# Patient Record
Sex: FEMALE | Race: OTHER | URBAN - METROPOLITAN AREA
[De-identification: names, ages, dates, MRNs, and addresses within clinical notes are randomized per-mention and may not be internally consistent; named-entity substitution may affect disease eponyms.]

---

## 2023-08-03 LAB
ABO, EXTERNAL RESULT: NORMAL
HEP B, EXTERNAL RESULT: NEGATIVE
HIV, EXTERNAL RESULT: NON REACTIVE
N. GONORRHOEAE, EXTERNAL RESULT: NEGATIVE
RH FACTOR, EXTERNAL RESULT: POSITIVE
RPR, EXTERNAL RESULT: NON REACTIVE
RUBELLA TITER, EXTERNAL RESULT: NORMAL

## 2023-08-28 ENCOUNTER — ROUTINE PRENATAL (OUTPATIENT)
Age: 23
End: 2023-08-28
Payer: COMMERCIAL

## 2023-08-28 VITALS
HEART RATE: 78 BPM | BODY MASS INDEX: 21.05 KG/M2 | WEIGHT: 131 LBS | DIASTOLIC BLOOD PRESSURE: 70 MMHG | SYSTOLIC BLOOD PRESSURE: 138 MMHG | HEIGHT: 66 IN

## 2023-08-28 DIAGNOSIS — Z3A.20 20 WEEKS GESTATION OF PREGNANCY: ICD-10-CM

## 2023-08-28 DIAGNOSIS — Z36.89 ENCOUNTER FOR FETAL ANATOMIC SURVEY: Primary | ICD-10-CM

## 2023-08-28 PROCEDURE — 76805 OB US >/= 14 WKS SNGL FETUS: CPT | Performed by: OBSTETRICS & GYNECOLOGY

## 2023-08-28 NOTE — PROCEDURES
PATIENT: Saniya Marroquin   -  : 2000   -  DOS:2023   -  INTERPRETING PROVIDER:Eli Abdalla,   Indication  ========    Fetal anatomic survey    Method  ======    Transabdominal ultrasound examination. View: Good view    Dating  ======    LMP on: 4/10/2023  GA by LMP 20 w + 0 d  NAVJOT by LMP: 1/15/2024  Ultrasound examination on: 2023  GA by U/S based upon: [facility], BPD, Femur, HC  GA by U/S 19 w + 2 d  NAVJOT by U/S: 2024  Assigned: based on the LMP, selected on 2023  Assigned GA 20 w + 0 d  Assigned NAVJOT: 1/15/2024    Fetal Biometry  ============    Standard  BPD 42.3 mm 18w 6d 9% Hadlock  OFD 56.5 mm 19w 6d 45% Alex  .7 mm 18w 5d 3% Hadlock  Cerebellum tr 20.2 mm 19w 3d 55% Hill  Nuchal fold 4.8 mm  .2 mm 19w 6d 39% Hadlock  Femur 30.3 mm 19w 3d 21% Hadlock  Humerus 28.0 mm 19w 0d 21% Alex   g 19w 3d 21% Hadlock  EFW (lb) 0 lb  EFW (oz) 10 oz  EFW by: Hadlock (BPD-HC-AC-FL)  Extended   4.7 mm  CM 2.2 mm  <1% Nicolaides  Nasal bone 6.5 mm  Head / Face / Neck  Nasal bone: present  Other Structures   bpm    General Evaluation  ==============    Cardiac activity present.  bpm. Fetal movements: visualized. Presentation: cephalic  Placenta: Placental site: posterior, not low lying  Umbilical cord: Cord vessels: 3 vessel cord. Insertion site: normal insertion  Amniotic fluid: Amount of AF: normal amount.  MVP 3.5 cm    Fetal Anatomy  ===========    Cranium: normal  Lateral ventricles: normal  Choroid plexus: normal  Midline falx: normal  Cavum septi pellucidi: normal  Cerebellum: normal  Cisterna magna: normal  Head / Neck  Neck: normal  Lips: normal  Profile: normal  Nose: normal  Face  Coronal face: normal  Nasal bone: present  Palate: normal  Maxilla: normal  Mandible: normal  Orbits: normal  4-chamber view: normal  RVOT view: normal  LVOT view: normal  3-vessel view: normal  3-vessel-trachea view: normal  Heart / Thorax  Situs: situs

## 2023-10-22 LAB — C. TRACHOMATIS, EXTERNAL RESULT: NEGATIVE

## 2023-12-28 ENCOUNTER — ROUTINE PRENATAL (OUTPATIENT)
Age: 23
End: 2023-12-28

## 2023-12-28 VITALS
TEMPERATURE: 97.5 F | SYSTOLIC BLOOD PRESSURE: 113 MMHG | HEIGHT: 66 IN | RESPIRATION RATE: 18 BRPM | HEART RATE: 99 BPM | WEIGHT: 149 LBS | DIASTOLIC BLOOD PRESSURE: 71 MMHG | OXYGEN SATURATION: 99 % | BODY MASS INDEX: 23.95 KG/M2

## 2023-12-28 DIAGNOSIS — Z86.19 HISTORY OF CHLAMYDIA: ICD-10-CM

## 2023-12-28 DIAGNOSIS — Z98.891 HISTORY OF CESAREAN DELIVERY: ICD-10-CM

## 2023-12-28 DIAGNOSIS — Z34.80 SUPERVISION OF OTHER NORMAL PREGNANCY, ANTEPARTUM: Primary | ICD-10-CM

## 2023-12-28 DIAGNOSIS — O36.8390 FETAL TACHYCARDIA AFFECTING MANAGEMENT OF MOTHER: ICD-10-CM

## 2023-12-28 LAB — GBS, EXTERNAL RESULT: POSITIVE

## 2023-12-28 NOTE — PROGRESS NOTES
Chief Complaint   Patient presents with    Routine Prenatal Visit     Patient is 37 weeks and 3 days. She is not having vaginal bleeding or discharge. She is taking her prenatal vitamins. She is having fetal movement. She has had blanca lo. She would like to discuss her birth plan and wants to know if she is going to have a c- section. Interpretor ID [id number]. No other concerns.       16yo  at 37w3d by LMP    IUP: Rh pos  low risk NIPT, carrier neg x4  GTT, CBC okay  s/p Tdap, declined flu  GBS today   Transfer CrossOver: records to be scanned into media   History of C/S: 2018, labored, failure to progress at 9cm (5hrs), had C/S for NRFHT, daughter needed resuscitation, in [place], no records available, weighed 2916g  counseled on risks associated with TOLAC and concern for history of FTP,  calculator 68% success rate, continue to  especially after SVE  History of Chlamydia: treated, BILL neg, repeat 3rd T screen neg   Fetal Tachycardia: NST reactive and reassuring, many accels to 180s    NST Procedure Note  Indication: fetal tachycardia  Impression: Reactive - 145/mod/+a/-d, no contractions  Time Monitored: 29 minutes    Feeding: breastfeeding  Family Planning: IUD   Estimated Date of Delivery: 1/15/24

## 2023-12-28 NOTE — CONSULTS
Session ID: [id number]  Language: Perez ([address] [place] [address])   ID: #[id number]   Name: Van Orr

## 2023-12-28 NOTE — CONSULTS
Session ID: [id number]  Language: Perez ([address] [place] [address])   ID: #[id number]   Name: Arpit Pacheco

## 2023-12-30 PROBLEM — Z98.891 HISTORY OF CESAREAN DELIVERY: Status: ACTIVE | Noted: 2023-12-30

## 2023-12-30 PROBLEM — Z34.80 SUPERVISION OF OTHER NORMAL PREGNANCY, ANTEPARTUM: Status: ACTIVE | Noted: 2023-12-30

## 2023-12-30 PROBLEM — O36.8390 FETAL TACHYCARDIA AFFECTING MANAGEMENT OF MOTHER: Status: ACTIVE | Noted: 2023-12-30

## 2023-12-30 PROBLEM — Z86.19 HISTORY OF CHLAMYDIA: Status: ACTIVE | Noted: 2023-12-30

## 2023-12-30 LAB
BACTERIA SPEC CULT: ABNORMAL
SERVICE CMNT-IMP: ABNORMAL

## 2024-01-04 ENCOUNTER — ROUTINE PRENATAL (OUTPATIENT)
Age: 24
End: 2024-01-04

## 2024-01-04 VITALS
DIASTOLIC BLOOD PRESSURE: 74 MMHG | HEIGHT: 66 IN | OXYGEN SATURATION: 98 % | BODY MASS INDEX: 23.08 KG/M2 | SYSTOLIC BLOOD PRESSURE: 119 MMHG | HEART RATE: 95 BPM | WEIGHT: 143.6 LBS | RESPIRATION RATE: 18 BRPM | TEMPERATURE: 98.1 F

## 2024-01-04 DIAGNOSIS — O09.93 SUPERVISION OF HIGH RISK PREGNANCY IN THIRD TRIMESTER: Primary | ICD-10-CM

## 2024-01-04 DIAGNOSIS — Z98.891 HISTORY OF CESAREAN DELIVERY: ICD-10-CM

## 2024-01-04 PROCEDURE — 0502F SUBSEQUENT PRENATAL CARE: CPT | Performed by: STUDENT IN AN ORGANIZED HEALTH CARE EDUCATION/TRAINING PROGRAM

## 2024-01-04 ASSESSMENT — PATIENT HEALTH QUESTIONNAIRE - PHQ9
SUM OF ALL RESPONSES TO PHQ9 QUESTIONS 1 & 2: 0
SUM OF ALL RESPONSES TO PHQ QUESTIONS 1-9: 0
SUM OF ALL RESPONSES TO PHQ QUESTIONS 1-9: 0
2. FEELING DOWN, DEPRESSED OR HOPELESS: 0
SUM OF ALL RESPONSES TO PHQ QUESTIONS 1-9: 0
1. LITTLE INTEREST OR PLEASURE IN DOING THINGS: 0
SUM OF ALL RESPONSES TO PHQ QUESTIONS 1-9: 0

## 2024-01-04 NOTE — PROGRESS NOTES
: [id number] & [id number]    Chief Complaint   Patient presents with    Routine Prenatal Visit        Patient identified by name and . Patient is a  at 38w3d.    Taking prenatal vitamins: Yes  Leakage of fluid: No  Vaginal bleeding: No  Feeling baby move if over 20 weeks: Yes  Contractions: Very few, last felt it on 1/3/24  Pain: No    Vitals:    24 1344 24 1401 24 1425   BP: (!) 145/77 (!) 142/72 119/74   Site: Left Upper Arm Right Upper Arm Left Upper Arm   Position: Sitting Sitting Sitting   Cuff Size: Small Adult Small Adult Small Adult   Pulse: 95     Resp: 18     Temp: 98.1 °F (36.7 °C)     TempSrc: Oral     SpO2: 98%     Weight: 65.1 kg (143 lb 9.6 oz)     Height: 1.664 m (5' 5.5\")            There is no immunization history on file for this patient.    1. Have you been to the ER, urgent care clinic since your last visit?  Hospitalized since your last visit? No    2. Have you seen or consulted any other health care providers outside of the [facility] since your last visit?  Include any pap smears or colon screening. No

## 2024-01-04 NOTE — PROGRESS NOTES
[facility]  Routine Prenatal Visit        23 y.o.  at 38w3d by LMP/20ws here for NIKOLE. Doing well, no complaints. -CTX, +FM, -vaginal bleeding or discharge, -PreE sxs including HA, Vision changes CP/SOB, RUQ pain, or COLE.      BP Readings from Last 3 Encounters:   24 (!) 142/72   23 113/71   23 138/70       Wt Readings from Last 10 Encounters:   24 65.1 kg (143 lb 9.6 oz)   23 67.6 kg (149 lb)   23 59.4 kg (131 lb)         General: NAD, well appearing  Resp: no increased WOB  Cardiac: color good, appears well perfused  Abdomen: no abdominal tenderness or distention  Extremities: no edema    SVE: deferred as she desires repeat    FH: 39  FHT: 145       24yo  at 37w3d by LMP/20ws     IUP: Rh pos  low risk NIPT, carrier neg x4  GTT, CBC okay  s/p Tdap, declined flu  GBS positive   Transfer CrossOver: records to be scanned into media   History of C/S: 2018, labored, failure to progress at 9cm (5hrs), had C/S for NRFHT, daughter needed resuscitation, in [place], no records available, weighed 2916g  counseled on risks associated with TOLAC and concern for history of FTP,  calculator 68% success rate. Discussed risks today. Desires repeat. Scheduled for  at 39w1d.  History of Chlamydia: treated, BILL neg, repeat 3rd T screen neg   GBS Positive: will need IAP.      Today: TOLAC counseling, scheduled repeat today for  at 12pm at 39 weeks .    Next Visit:         Follow up in 1 weeks.  Next appointment scheduled 1/10/2024    Derik Rhoades MD, MPH  [facility]

## 2024-01-09 ENCOUNTER — ANESTHESIA EVENT (OUTPATIENT)
Facility: HOSPITAL | Age: 24
DRG: 540 | End: 2024-01-09
Payer: COMMERCIAL

## 2024-01-09 ENCOUNTER — HOSPITAL ENCOUNTER (INPATIENT)
Facility: HOSPITAL | Age: 24
LOS: 2 days | Discharge: HOME | DRG: 540 | End: 2024-01-11
Attending: FAMILY MEDICINE | Admitting: FAMILY MEDICINE
Payer: COMMERCIAL

## 2024-01-09 ENCOUNTER — ANESTHESIA (OUTPATIENT)
Facility: HOSPITAL | Age: 24
DRG: 540 | End: 2024-01-09
Payer: COMMERCIAL

## 2024-01-09 DIAGNOSIS — Z98.891 HISTORY OF CESAREAN DELIVERY: Primary | ICD-10-CM

## 2024-01-09 PROBLEM — Z3A.39 39 WEEKS GESTATION OF PREGNANCY: Status: ACTIVE | Noted: 2024-01-09

## 2024-01-09 LAB
ABO + RH BLD: NORMAL
BLOOD GROUP ANTIBODIES SERPL: NORMAL
ERYTHROCYTE [DISTWIDTH] IN BLOOD BY AUTOMATED COUNT: 12.6 % (ref 11.5–14.5)
HCT VFR BLD AUTO: 30.6 % (ref 35–47)
HGB BLD-MCNC: 10.5 G/DL (ref 11.5–16)
MCH RBC QN AUTO: 28.5 PG (ref 26–34)
MCHC RBC AUTO-ENTMCNC: 34.3 G/DL (ref 30–36.5)
MCV RBC AUTO: 82.9 FL (ref 80–99)
NRBC # BLD: 0 K/UL (ref 0–0.01)
NRBC BLD-RTO: 0 PER 100 WBC
PLATELET # BLD AUTO: 273 K/UL (ref 150–400)
PMV BLD AUTO: 10.1 FL (ref 8.9–12.9)
RBC # BLD AUTO: 3.69 M/UL (ref 3.8–5.2)
SPECIMEN EXP DATE BLD: NORMAL
WBC # BLD AUTO: 6.5 K/UL (ref 3.6–11)

## 2024-01-09 PROCEDURE — 2580000003 HC RX 258: Performed by: NURSE ANESTHETIST, CERTIFIED REGISTERED

## 2024-01-09 PROCEDURE — 36415 COLL VENOUS BLD VENIPUNCTURE: CPT

## 2024-01-09 PROCEDURE — 1120000000 HC RM PRIVATE OB

## 2024-01-09 PROCEDURE — 3700000000 HC ANESTHESIA ATTENDED CARE: Performed by: FAMILY MEDICINE

## 2024-01-09 PROCEDURE — 94761 N-INVAS EAR/PLS OXIMETRY MLT: CPT

## 2024-01-09 PROCEDURE — 85027 COMPLETE CBC AUTOMATED: CPT

## 2024-01-09 PROCEDURE — 2580000003 HC RX 258

## 2024-01-09 PROCEDURE — 7100000000 HC PACU RECOVERY - FIRST 15 MIN: Performed by: FAMILY MEDICINE

## 2024-01-09 PROCEDURE — 86901 BLOOD TYPING SEROLOGIC RH(D): CPT

## 2024-01-09 PROCEDURE — 6360000002 HC RX W HCPCS

## 2024-01-09 PROCEDURE — 2500000003 HC RX 250 WO HCPCS: Performed by: NURSE ANESTHETIST, CERTIFIED REGISTERED

## 2024-01-09 PROCEDURE — 86780 TREPONEMA PALLIDUM: CPT

## 2024-01-09 PROCEDURE — 86850 RBC ANTIBODY SCREEN: CPT

## 2024-01-09 PROCEDURE — 6360000002 HC RX W HCPCS: Performed by: NURSE ANESTHETIST, CERTIFIED REGISTERED

## 2024-01-09 PROCEDURE — 3609079900 HC CESAREAN SECTION: Performed by: FAMILY MEDICINE

## 2024-01-09 PROCEDURE — 3700000001 HC ADD 15 MINUTES (ANESTHESIA): Performed by: FAMILY MEDICINE

## 2024-01-09 PROCEDURE — 7100000001 HC PACU RECOVERY - ADDTL 15 MIN: Performed by: FAMILY MEDICINE

## 2024-01-09 PROCEDURE — 86900 BLOOD TYPING SEROLOGIC ABO: CPT

## 2024-01-09 RX ORDER — SODIUM CHLORIDE, SODIUM LACTATE, POTASSIUM CHLORIDE, AND CALCIUM CHLORIDE .6; .31; .03; .02 G/100ML; G/100ML; G/100ML; G/100ML
1000 INJECTION, SOLUTION INTRAVENOUS ONCE
Status: COMPLETED | OUTPATIENT
Start: 2024-01-09 | End: 2024-01-09

## 2024-01-09 RX ORDER — SODIUM CHLORIDE 0.9 % (FLUSH) 0.9 %
5-40 SYRINGE (ML) INJECTION EVERY 12 HOURS SCHEDULED
Status: DISCONTINUED | OUTPATIENT
Start: 2024-01-09 | End: 2024-01-11 | Stop reason: HOSPADM

## 2024-01-09 RX ORDER — SODIUM CHLORIDE 9 MG/ML
INJECTION, SOLUTION INTRAVENOUS PRN
Status: DISCONTINUED | OUTPATIENT
Start: 2024-01-09 | End: 2024-01-11 | Stop reason: HOSPADM

## 2024-01-09 RX ORDER — OXYTOCIN 10 [USP'U]/ML
INJECTION, SOLUTION INTRAMUSCULAR; INTRAVENOUS PRN
Status: DISCONTINUED | OUTPATIENT
Start: 2024-01-09 | End: 2024-01-09 | Stop reason: SDUPTHER

## 2024-01-09 RX ORDER — DOCUSATE SODIUM 100 MG/1
100 CAPSULE, LIQUID FILLED ORAL 2 TIMES DAILY
Status: DISCONTINUED | OUTPATIENT
Start: 2024-01-09 | End: 2024-01-11 | Stop reason: HOSPADM

## 2024-01-09 RX ORDER — 0.9 % SODIUM CHLORIDE 0.9 %
INTRAVENOUS SOLUTION INTRAVENOUS PRN
Status: DISCONTINUED | OUTPATIENT
Start: 2024-01-09 | End: 2024-01-09 | Stop reason: SDUPTHER

## 2024-01-09 RX ORDER — ACETAMINOPHEN 500 MG
1000 TABLET ORAL EVERY 8 HOURS PRN
Status: DISCONTINUED | OUTPATIENT
Start: 2024-01-09 | End: 2024-01-11 | Stop reason: HOSPADM

## 2024-01-09 RX ORDER — FAMOTIDINE 10 MG/ML
INJECTION, SOLUTION INTRAVENOUS PRN
Status: DISCONTINUED | OUTPATIENT
Start: 2024-01-09 | End: 2024-01-09 | Stop reason: SDUPTHER

## 2024-01-09 RX ORDER — SODIUM CHLORIDE 0.9 % (FLUSH) 0.9 %
10 SYRINGE (ML) INJECTION PRN
Status: DISCONTINUED | OUTPATIENT
Start: 2024-01-09 | End: 2024-01-11 | Stop reason: HOSPADM

## 2024-01-09 RX ORDER — ONDANSETRON 2 MG/ML
4 INJECTION INTRAMUSCULAR; INTRAVENOUS EVERY 6 HOURS PRN
Status: DISCONTINUED | OUTPATIENT
Start: 2024-01-09 | End: 2024-01-11 | Stop reason: HOSPADM

## 2024-01-09 RX ORDER — SODIUM CHLORIDE, SODIUM LACTATE, POTASSIUM CHLORIDE, CALCIUM CHLORIDE 600; 310; 30; 20 MG/100ML; MG/100ML; MG/100ML; MG/100ML
INJECTION, SOLUTION INTRAVENOUS CONTINUOUS
Status: DISCONTINUED | OUTPATIENT
Start: 2024-01-09 | End: 2024-01-11 | Stop reason: HOSPADM

## 2024-01-09 RX ORDER — KETOROLAC TROMETHAMINE 30 MG/ML
INJECTION, SOLUTION INTRAMUSCULAR; INTRAVENOUS PRN
Status: DISCONTINUED | OUTPATIENT
Start: 2024-01-09 | End: 2024-01-09 | Stop reason: SDUPTHER

## 2024-01-09 RX ORDER — LANOLIN/MINERAL OIL
LOTION (ML) TOPICAL
Status: DISCONTINUED | OUTPATIENT
Start: 2024-01-09 | End: 2024-01-11 | Stop reason: HOSPADM

## 2024-01-09 RX ORDER — KETOROLAC TROMETHAMINE 30 MG/ML
20 INJECTION, SOLUTION INTRAMUSCULAR; INTRAVENOUS EVERY 6 HOURS
Status: DISCONTINUED | OUTPATIENT
Start: 2024-01-09 | End: 2024-01-10

## 2024-01-09 RX ORDER — SWAB
1 SWAB, NON-MEDICATED MISCELLANEOUS DAILY
Status: DISCONTINUED | OUTPATIENT
Start: 2024-01-09 | End: 2024-01-11 | Stop reason: HOSPADM

## 2024-01-09 RX ORDER — IBUPROFEN 800 MG/1
800 TABLET ORAL EVERY 6 HOURS
Status: DISCONTINUED | OUTPATIENT
Start: 2024-01-10 | End: 2024-01-10

## 2024-01-09 RX ORDER — ACETAMINOPHEN 325 MG/1
975 TABLET ORAL ONCE
Status: DISCONTINUED | OUTPATIENT
Start: 2024-01-09 | End: 2024-01-09

## 2024-01-09 RX ORDER — ONDANSETRON 2 MG/ML
INJECTION INTRAMUSCULAR; INTRAVENOUS PRN
Status: DISCONTINUED | OUTPATIENT
Start: 2024-01-09 | End: 2024-01-09 | Stop reason: SDUPTHER

## 2024-01-09 RX ORDER — ACETAMINOPHEN 500 MG
1000 TABLET ORAL EVERY 8 HOURS SCHEDULED
Status: CANCELLED | OUTPATIENT
Start: 2024-01-09

## 2024-01-09 RX ORDER — FENTANYL CITRATE 50 UG/ML
INJECTION, SOLUTION INTRAMUSCULAR; INTRAVENOUS PRN
Status: DISCONTINUED | OUTPATIENT
Start: 2024-01-09 | End: 2024-01-09 | Stop reason: SDUPTHER

## 2024-01-09 RX ORDER — OXYCODONE HYDROCHLORIDE 5 MG/1
5 TABLET ORAL EVERY 4 HOURS PRN
Status: DISCONTINUED | OUTPATIENT
Start: 2024-01-09 | End: 2024-01-11 | Stop reason: HOSPADM

## 2024-01-09 RX ORDER — IBUPROFEN 800 MG/1
800 TABLET ORAL EVERY 8 HOURS SCHEDULED
Status: DISCONTINUED | OUTPATIENT
Start: 2024-01-09 | End: 2024-01-09 | Stop reason: SDUPTHER

## 2024-01-09 RX ORDER — BUPIVACAINE HYDROCHLORIDE 7.5 MG/ML
INJECTION, SOLUTION INTRASPINAL PRN
Status: DISCONTINUED | OUTPATIENT
Start: 2024-01-09 | End: 2024-01-09 | Stop reason: SDUPTHER

## 2024-01-09 RX ORDER — SODIUM CHLORIDE 0.9 % (FLUSH) 0.9 %
5-40 SYRINGE (ML) INJECTION PRN
Status: DISCONTINUED | OUTPATIENT
Start: 2024-01-09 | End: 2024-01-11 | Stop reason: HOSPADM

## 2024-01-09 RX ORDER — MORPHINE SULFATE 1 MG/ML
INJECTION, SOLUTION EPIDURAL; INTRATHECAL; INTRAVENOUS PRN
Status: DISCONTINUED | OUTPATIENT
Start: 2024-01-09 | End: 2024-01-09 | Stop reason: SDUPTHER

## 2024-01-09 RX ADMIN — SODIUM CHLORIDE, POTASSIUM CHLORIDE, SODIUM LACTATE AND CALCIUM CHLORIDE: 600; 310; 30; 20 INJECTION, SOLUTION INTRAVENOUS at 14:13

## 2024-01-09 RX ADMIN — SODIUM CHLORIDE, POTASSIUM CHLORIDE, SODIUM LACTATE AND CALCIUM CHLORIDE: 600; 310; 30; 20 INJECTION, SOLUTION INTRAVENOUS at 20:42

## 2024-01-09 RX ADMIN — OXYTOCIN 30 UNITS: 10 INJECTION INTRAVENOUS at 13:26

## 2024-01-09 RX ADMIN — SODIUM CHLORIDE, POTASSIUM CHLORIDE, SODIUM LACTATE AND CALCIUM CHLORIDE: 600; 310; 30; 20 INJECTION, SOLUTION INTRAVENOUS at 11:44

## 2024-01-09 RX ADMIN — BUPIVACAINE HYDROCHLORIDE IN DEXTROSE 1.3 ML: 7.5 INJECTION, SOLUTION SUBARACHNOID at 12:59

## 2024-01-09 RX ADMIN — FENTANYL CITRATE 10 MCG: 50 INJECTION, SOLUTION INTRAMUSCULAR; INTRAVENOUS at 12:59

## 2024-01-09 RX ADMIN — MORPHINE SULFATE 0.15 MG: 1 INJECTION, SOLUTION EPIDURAL; INTRATHECAL; INTRAVENOUS at 12:59

## 2024-01-09 RX ADMIN — SODIUM CHLORIDE 500 ML: 900 INJECTION, SOLUTION INTRAVENOUS at 14:21

## 2024-01-09 RX ADMIN — PHENYLEPHRINE HYDROCHLORIDE 20 MCG/MIN: 10 INJECTION INTRAVENOUS at 12:59

## 2024-01-09 RX ADMIN — FAMOTIDINE 20 MG: 10 INJECTION, SOLUTION INTRAVENOUS at 12:53

## 2024-01-09 RX ADMIN — KETOROLAC TROMETHAMINE 15 MG: 30 INJECTION, SOLUTION INTRAMUSCULAR; INTRAVENOUS at 13:59

## 2024-01-09 RX ADMIN — CEFAZOLIN SODIUM 2000 MG: 1 POWDER, FOR SOLUTION INTRAMUSCULAR; INTRAVENOUS at 13:06

## 2024-01-09 RX ADMIN — SODIUM CHLORIDE, POTASSIUM CHLORIDE, SODIUM LACTATE AND CALCIUM CHLORIDE 1000 ML: 600; 310; 30; 20 INJECTION, SOLUTION INTRAVENOUS at 10:40

## 2024-01-09 RX ADMIN — ONDANSETRON 4 MG: 2 INJECTION INTRAMUSCULAR; INTRAVENOUS at 12:53

## 2024-01-09 RX ADMIN — KETOROLAC TROMETHAMINE 20 MG: 30 INJECTION, SOLUTION INTRAMUSCULAR; INTRAVENOUS at 23:43

## 2024-01-09 ASSESSMENT — PAIN DESCRIPTION - DESCRIPTORS: DESCRIPTORS: ACHING;CRAMPING;SORE

## 2024-01-09 ASSESSMENT — PAIN SCALES - GENERAL: PAINLEVEL_OUTOF10: 4

## 2024-01-09 ASSESSMENT — PAIN DESCRIPTION - ORIENTATION: ORIENTATION: LOWER

## 2024-01-09 ASSESSMENT — PAIN DESCRIPTION - LOCATION: LOCATION: ABDOMEN;INCISION

## 2024-01-09 NOTE — CARE COORDINATION
1/9/2024  3:55 PM  CM obtained signed breast pump order and uploaded via [employer] to [email or web address].  Request for pump submitted and requested to be mailed to CEDRICK's home.     1:59 PM  CM met with CEDRICK to complete initial assessment and begin discharge planning.  MOB verified and confirmed demographics.  CEDRICK lives with Ernesto Nickerson, along with her 5yr old, at the address on file. CEDRICK reports she has good family support, and feels like she has the support she needs when she returns home.  CEDRICK denied any concerns for safety for herself or for infant. CEDRICK plans to breast and bottle feed baby and would like to order a pump to use at home.  [facility] will provide follow up care for infant. CEDRICK has car seat, bassinet/crib, clothing, bottles and all necessary supplies for baby. CEDRICK has [employer], and will be adding baby to this policy. CM discussed process to add baby to insurance, CEDRICK verbalized understanding.       01/09/24 1358   Service Assessment   Patient Orientation Alert and Oriented   Cognition Alert   History Provided By Patient   Primary Caregiver Family   Support Systems Spouse/Significant Other   PCP Verified by CM Yes   Prior Functional Level Independent in ADLs/IADLs   Current Functional Level Independent in ADLs/IADLs   Can patient return to prior living arrangement Yes   Family able to assist with home care needs: Yes   Would you like for me to discuss the discharge plan with any other family members/significant others, and if so, who? No   Financial Resources Medicaid     ALCON Jacobs

## 2024-01-09 NOTE — ANESTHESIA PRE PROCEDURE
Department of Anesthesiology  Preprocedure Note       Name:  Yolanda Cheney   Age:  23 y.o.  :  2000                                          MRN:  [id number]         Date:  2024      Surgeon: Surgeon(s):  Asiya Salgado DO    Procedure: Procedure(s):   SECTION    Medications prior to admission:   Prior to Admission medications    Medication Sig Start Date End Date Taking? Authorizing Provider   Prenatal Vit-Fe Fumarate-FA (PRENATAL VITAMINS PO) Take by mouth    Provider, MD Karla       Current medications:    Current Facility-Administered Medications   Medication Dose Route Frequency Provider Last Rate Last Admin   • lactated ringers IV soln infusion   IntraVENous Continuous Peggy Herrera DO       • lactated ringers bolus bolus 1,000 mL  1,000 mL IntraVENous Once Peggy Herrera DO       • sodium chloride flush 0.9 % injection 5-40 mL  5-40 mL IntraVENous 2 times per day Peggy Herrera DO       • sodium chloride flush 0.9 % injection 10 mL  10 mL IntraVENous PRN Peggy Herrera DO       • 0.9 % sodium chloride infusion   IntraVENous PRN Peggy Herrera DO       • famotidine (PEPCID) 20 mg in sodium chloride (PF) 0.9 % 10 mL injection  20 mg IntraVENous Once Peggy Herrera DO       • acetaminophen (TYLENOL) tablet 975 mg  975 mg Oral Once Peggy Herrera DO       • oxytocin (PITOCIN) 30 units in 500 mL infusion  87.3 jerald-units/min IntraVENous Continuous PRN Peggy Herrera DO        And   • oxytocin (PITOCIN) 10 unit bolus from the bag  10 Units IntraVENous PRN Peggy Herrera DO       • ondansetron (ZOFRAN) injection 4 mg  4 mg IntraVENous Q6H PRN Peggy Herrera DO       • ceFAZolin (ANCEF) 2,000 mg in sterile water 20 mL IV syringe  2,000 mg IntraVENous Once Peggy Herrera DO           Allergies:  No Known Allergies    Problem List:    Patient Active Problem List

## 2024-01-09 NOTE — L&D DELIVERY NOTE
Daniel Araujo, Female Yolanda [[id number]]    24yo  at 39w1d s/p rLTCS. Pregnancy notable for transfer of care from [facility], history of C/S for NRFHT/FTP at 9cm, history of chlamydia (neg BILL), GBS pos.   Labor Events     Labor: No   Steroids: None  Cervical Ripening Date/Time:        Rupture Date/Time:   13:23:30   Rupture Type: AROM  Fluid Color: Clear  Fluid Odor: None  Fluid Volume: Moderate  Induction: None  Augmentation: None  Labor Complications: None              Anesthesia    Method: Spinal       Delivery Details      Delivery Date: 24 Delivery Time: 13:24:00   Delivery Type: , Classical  Trial of Labor?: No   Categorization: Repeat   Priority: scheduled  Indications for : Prior Uterine Surgery       Skin Incision Type: Low Transverse  Uterine Incision: Low Transverse        Presentation    Presentation: Vertex       Shoulder Dystocia    Shoulder Dystocia Present?: No       Assisted Delivery Details    Forceps Attempted?: No  Vacuum Extractor Attempted?: No                           Cord    Vessels: 3 Vessels  Complications: None  Delayed Cord Clamping?: Yes  Cord Clamped Date/Time: 2024 13:25:00  Cord Blood Disposition: Discard  Gases Sent?: No              Placenta    Date/Time: 2024 13:25:00  Removal: Manual Removal       Lacerations    Episiotomy: None  Other Lacerations: no non-perineal laceration  Number of Repair Packets: 0       Blood Loss  Mother: Daniel Araujo Yolanda Salas #[id number]     Start of Mother's Information      Delivery Blood Loss  24 1244 - 24 1417      None                 End of Mother's Information  Mother: Daniel Araujo Yolanda Salas #[id number]                Delivery Providers    Delivering clinician: Asiya Salgado, DO     Provider Role    Asiya Salgado, DO Obstetrician    Noelle Lindquist, RN Primary Nurse    Netta Fam, RN Primary  Nurse

## 2024-01-09 NOTE — OP NOTE
Section Operative Note    Date: 2024    Procedure:   1. Repeat  section via low transverse uterine incision    Pre-operative Diagnosis:   1. 39w1d intrauterine pregnancy  2. History of C/S x 1; desires repeat.    Post-Operative Diagnosis: same; delivered     Attending Surgeon: Asiya Salgado DO  Assistant Surgeon: Brendan Carpenter Jr., MD  Surgical Assistant: Mariah Iniguez    Assistants:   Dr. Carpenter, the assistant surgeon, was present during the procedure.  The physician's presence was necessary due to repeat .The assistant surgeon performed significant portions of the surgery, including incising tissue, clamping, control of bleeding with suturing and cauterization, and decision making at challenging portions of the procedure.  This assistance was necessary to accomplish the optimal outcome for the patient, above and beyond what a non-MD assistant could have provided.)    Anesthesia: Spinal     Operative Findings:  1. Female infant in cephalic presentation, Apgar scores of 9   at 1 minute and 9   at 5 minutes and a birthweight pending per protocol.  2. Normal bilateral fallopian tubes and ovaries   3. Normal Uterus and uterine cavity    Estimated Blood Loss:  640cc  Urine Output: 90 mL of clear, dark yellow urine  IV Fluids: 3000 mL crystalloid    Drains: Padro to gravity           Specimens:   1. Placenta discarded - small clot noted at velamentous-appearing insertion           Complications:  None           Disposition: Back to L&D, stable            Indications: Yolanda Cheney is a 23 y.o.  at 39w1d with an NAVJOT of 1/15/2024, by Last Menstrual Period who was admitted for scheduled repeat C/S.  The risks, benefits, complications, and expected outcomes of  section were discussed with the patient. The patient agreed with the proposed plan; and all consents were signed and witnessed. She agreed to accept blood products if

## 2024-01-09 NOTE — ANESTHESIA POSTPROCEDURE EVALUATION
Department of Anesthesiology  Postprocedure Note    Patient: Yolanda Cheney  MRN: [id number]  YOB: 2000  Date of evaluation: 2024    Procedure Summary     Date: 24 Room / Location: [facility] L&D 02 / [facility] L&D OR    Anesthesia Start: 1251 Anesthesia Stop: 1431    Procedure:  SECTION Diagnosis:       Previous  delivery affecting pregnancy      (Previous  delivery affecting pregnancy [O34.219])    Surgeons: Asiya Salgado DO Responsible Provider: Justus Gates MD    Anesthesia Type: Spinal ASA Status: 2          Anesthesia Type: Spinal    Miguel Phase I:      Miguel Phase II:      Anesthesia Post Evaluation    Patient location during evaluation: PACU  Patient participation: complete - patient participated  Level of consciousness: awake  Airway patency: patent  Nausea & Vomiting: no vomiting and no nausea  Cardiovascular status: hemodynamically stable  Respiratory status: acceptable  Hydration status: stable  Pain management: adequate        No notable events documented.

## 2024-01-09 NOTE — ANESTHESIA POSTPROCEDURE EVALUATION
Department of Anesthesiology  Postprocedure Note    Patient: Yolanda Cheney  MRN: [id number]  YOB: 2000  Date of evaluation: 2024    Procedure Summary     Date: 24 Room / Location: [facility] L&D 02 / [facility] L&D OR    Anesthesia Start: 1251 Anesthesia Stop: 1431    Procedure:  SECTION Diagnosis:       Previous  delivery affecting pregnancy      (Previous  delivery affecting pregnancy [O34.219])    Surgeons: Asiya Salgado DO Responsible Provider: Justus Gates MD    Anesthesia Type: Spinal ASA Status: 2          Anesthesia Type: Spinal    Miguel Phase I:      Miguel Phase II:      Anesthesia Post Evaluation    Patient location during evaluation: PACU  Patient participation: complete - patient participated  Level of consciousness: awake  Airway patency: patent  Nausea & Vomiting: no vomiting and no nausea  Cardiovascular status: hemodynamically stable  Respiratory status: acceptable  Hydration status: stable  Pain management: adequate        No notable events documented.

## 2024-01-09 NOTE — LACTATION NOTE
This note was copied from a baby's chart.  Experienced breastfeeding mother.     Discussed with mother her plan for feeding.  Reviewed the benefits of exclusive breast milk feeding during the hospital stay.   Informed her of the risks of using formula to supplement in the first few days of life as well as the benefits of successful breast milk feeding; referred her to the Breastfeeding booklet about this information.   She acknowledges understanding of information reviewed and states that it is her plan to breast/formula feed her infant.  Will support her choice and offer additional information as needed.       Encouraged mom to attempt feeding with baby led feeding cues. Just as sucking on fingers, rooting, mouthing.   Looking for 8-12 feedings in 24 hours.   Don't limit baby at breast, allow baby to come of breast on it's own. Baby may want to feed  often and may increase number of feedings on second day of life. Skin to skin encouraged.      If baby doesn't nurse,  Mom should  hand express  10-20 drops of colostrum and drip into baby's mouth, or give to baby by finger feeding, cup feeding, or spoon feeding at least every 2-3 hours.     Mother will successfully establish breastfeeding by feeding in response to early feeding cues   or wake every 3h, will obtain deep latch, and will keep log of feedings/output.  Taught to BF at hunger cues and or q 2-3 hrs and to offer 10-20 drops of hand expressed colostrum at any non-feeds.      Left Breast: Soft  Left Nipple: Protrude  Right Nipple: Protrude  Right Breast: Soft                Breast Care: Lanolin provided     Lactation Comment: Baby last breast fed at 1425 for 30 minutes.    Breastfeeding handouts given in [place].

## 2024-01-09 NOTE — ANESTHESIA PROCEDURE NOTES
Spinal Block    Patient location during procedure: OR  End time: 1/9/2024 12:59 PM  Reason for block: primary anesthetic  Staffing  Performed: resident/CRNA   Anesthesiologist: Justus Gates MD  Resident/CRNA: Fernando Nj APRN - CRNA  Performed by: Fernando Nj APRN - CRNA  Authorized by: Justus Gates MD    Spinal Block  Patient position: sitting  Prep: DuraPrep  Patient monitoring: continuous pulse ox and frequent blood pressure checks  Approach: midline  Location: L3/L4  Provider prep: mask and sterile gloves  Needle  Needle type: Pencan   Needle gauge: 25 G  Needle length: 3.5 in  Assessment  Sensory level: T4  Swirl obtained: Yes  CSF: clear  Attempts: 1  Hemodynamics: stable  Preanesthetic Checklist  Completed: patient identified, IV checked, site marked, risks and benefits discussed, surgical/procedural consents, equipment checked, pre-op evaluation, timeout performed, anesthesia consent given, oxygen available, monitors applied/VS acknowledged, fire risk safety assessment completed and verbalized and blood product R/B/A discussed and consented

## 2024-01-10 LAB
ERYTHROCYTE [DISTWIDTH] IN BLOOD BY AUTOMATED COUNT: 12.8 % (ref 11.5–14.5)
HCT VFR BLD AUTO: 26.9 % (ref 35–47)
HGB BLD-MCNC: 9.1 G/DL (ref 11.5–16)
MCH RBC QN AUTO: 28.5 PG (ref 26–34)
MCHC RBC AUTO-ENTMCNC: 33.8 G/DL (ref 30–36.5)
MCV RBC AUTO: 84.3 FL (ref 80–99)
NRBC # BLD: 0 K/UL (ref 0–0.01)
NRBC BLD-RTO: 0 PER 100 WBC
PLATELET # BLD AUTO: 218 K/UL (ref 150–400)
PMV BLD AUTO: 9.9 FL (ref 8.9–12.9)
RBC # BLD AUTO: 3.19 M/UL (ref 3.8–5.2)
T PALLIDUM AB SER QL IA: NON REACTIVE
WBC # BLD AUTO: 7.3 K/UL (ref 3.6–11)

## 2024-01-10 PROCEDURE — 6360000002 HC RX W HCPCS

## 2024-01-10 PROCEDURE — 1120000000 HC RM PRIVATE OB

## 2024-01-10 PROCEDURE — 36415 COLL VENOUS BLD VENIPUNCTURE: CPT

## 2024-01-10 PROCEDURE — 94761 N-INVAS EAR/PLS OXIMETRY MLT: CPT

## 2024-01-10 PROCEDURE — 85027 COMPLETE CBC AUTOMATED: CPT

## 2024-01-10 PROCEDURE — 2580000003 HC RX 258

## 2024-01-10 PROCEDURE — 6370000000 HC RX 637 (ALT 250 FOR IP): Performed by: STUDENT IN AN ORGANIZED HEALTH CARE EDUCATION/TRAINING PROGRAM

## 2024-01-10 PROCEDURE — 6370000000 HC RX 637 (ALT 250 FOR IP)

## 2024-01-10 PROCEDURE — 6360000002 HC RX W HCPCS: Performed by: STUDENT IN AN ORGANIZED HEALTH CARE EDUCATION/TRAINING PROGRAM

## 2024-01-10 RX ORDER — KETOROLAC TROMETHAMINE 30 MG/ML
30 INJECTION, SOLUTION INTRAMUSCULAR; INTRAVENOUS EVERY 6 HOURS
Status: COMPLETED | OUTPATIENT
Start: 2024-01-10 | End: 2024-01-10

## 2024-01-10 RX ORDER — IBUPROFEN 800 MG/1
800 TABLET ORAL EVERY 6 HOURS
Status: DISCONTINUED | OUTPATIENT
Start: 2024-01-10 | End: 2024-01-11 | Stop reason: HOSPADM

## 2024-01-10 RX ADMIN — Medication 1 TABLET: at 09:53

## 2024-01-10 RX ADMIN — KETOROLAC TROMETHAMINE 30 MG: 30 INJECTION, SOLUTION INTRAMUSCULAR; INTRAVENOUS at 12:27

## 2024-01-10 RX ADMIN — DOCUSATE SODIUM 100 MG: 100 CAPSULE, LIQUID FILLED ORAL at 09:53

## 2024-01-10 RX ADMIN — IBUPROFEN 800 MG: 800 TABLET, FILM COATED ORAL at 18:21

## 2024-01-10 RX ADMIN — KETOROLAC TROMETHAMINE 20 MG: 30 INJECTION, SOLUTION INTRAMUSCULAR; INTRAVENOUS at 06:32

## 2024-01-10 RX ADMIN — SODIUM CHLORIDE, PRESERVATIVE FREE 10 ML: 5 INJECTION INTRAVENOUS at 12:28

## 2024-01-10 ASSESSMENT — PAIN DESCRIPTION - LOCATION: LOCATION: ABDOMEN;INCISION

## 2024-01-10 ASSESSMENT — PAIN DESCRIPTION - ORIENTATION: ORIENTATION: LOWER

## 2024-01-10 ASSESSMENT — PAIN DESCRIPTION - DESCRIPTORS: DESCRIPTORS: ACHING;CRAMPING;SORE

## 2024-01-10 ASSESSMENT — PAIN SCALES - GENERAL: PAINLEVEL_OUTOF10: 6

## 2024-01-10 NOTE — LACTATION NOTE
This note was copied from a baby's chart.  Mother was sitting up in bed. Baby was having a hearing screen done. Mother has been breast/formula feeding her baby. Encouraged mother to offer baby her breast milk first.     Reviewed breastfeeding basics:  Supply and demand,  stomach size, early  Feeding cues, skin to skin, positioning and baby led latch-on, assymetrical latch with signs of good, deep latch vs shallow, feeding frequency and duration, and log sheet for tracking infant feedings and output.  Breastfeeding Booklet and Warm line information given.  Discussed typical  weight loss and the importance of infant weight checks with pediatrician 1-2 post discharge.    Discussed eating a healthy diet. Instructed mother to eat a variety of foods in order to get a well balanced diet. She should consume an extra 500 calories per day (more than her non-pregnant requirement.) These extra calories will help provide energy needed for optimal breast milk production. Mother also encouraged to \"drink to thirst\" and it is recommended that she drink fluids such as water, fruit/vegetable juice. Nutritious snacks should be available so that she can eat throughout the day to help satisfy her hunger and maintain a good milk supply.    Mother will successfully establish breastfeeding by feeding in response to early feeding cues   or wake every 3h, will obtain deep latch, and will keep log of feedings/output.  Taught to BF at hunger cues and or q 2-3 hrs and to offer 10-20 drops of hand expressed colostrum at any non-feeds.      Left Breast: Soft  Left Nipple: Protrude  Right Nipple: Protrude  Right Breast: Soft               Formula Type: Similac 360 Total Care     \  Breast Care: Nursing pads, Lanolin provided     Lactation Comment: Baby last breast fed at 0925 for 20 minutes and took 33 ml of formula at 0945. Encouraged mother to call  for breastfeeding assistance.

## 2024-01-10 NOTE — PROGRESS NOTES
[address] [place] [address]   Office [phone], Fax [phone]                      Post-Operative Day Number 1 Progress Note    Patient doing well post-operatively without significant complaint.  Pain well controlled.  Lochia normal.  Tolerating normal diet.  Ambulating.  Voiding without difficulty.  Passing flatus.     Vitals:  Patient Vitals for the past 8 hrs:   BP Temp Pulse Resp SpO2   01/10/24 0244 117/71 98.6 °F (37 °C) 89 16 100 %   24 2318 108/70 99 °F (37.2 °C) 99 16 100 %     Temp (24hrs), Av.3 °F (36.8 °C), Min:97.4 °F (36.3 °C), Max:99 °F (37.2 °C)      Exam:  Patient without distress.               CTAB, no w/r/r/c.               RRR, +S1 and S2, no m/r/g.    Abdomen soft, fundus firm at level of umbilicus, nontender.               Perineum with normal lochia noted.               Lower extremities:  No edema. No palpable cords or tenderness.    Lab/Data Review:  Recent Results (from the past 12 hour(s))   CBC    Collection Time: 01/10/24  5:40 AM   Result Value Ref Range    WBC 7.3 3.6 - 11.0 K/uL    RBC 3.19 (L) 3.80 - 5.20 M/uL    Hemoglobin 9.1 (L) 11.5 - 16.0 g/dL    Hematocrit 26.9 (L) 35.0 - 47.0 %    MCV 84.3 80.0 - 99.0 FL    MCH 28.5 26.0 - 34.0 PG    MCHC 33.8 30.0 - 36.5 g/dL    RDW 12.8 11.5 - 14.5 %    Platelets 218 150 - 400 K/uL    MPV 9.9 8.9 - 12.9 FL    Nucleated RBCs 0.0 0  WBC    nRBC 0.00 0.00 - 0.01 K/uL         Assessment and Plan:    Yolanda Cheney is a 23 y.o.  s/p rLTCS at 39+1. Delivery uncomplicated. Pregnancy notable for transfer of care from [facility], hx of C/S for NRFTHT/failure to progress at 9cm, hx of chlamydia with BILL negative. Patient appears to be having uncomplicated post-partum course.      Continue routine perineal care and maternal education.    Plan discharge tomorrow if no problems occur.    Patient discussed with Dr. Doty.                 Peggy Herrera, DO  Family Medicine

## 2024-01-11 VITALS
DIASTOLIC BLOOD PRESSURE: 68 MMHG | OXYGEN SATURATION: 100 % | SYSTOLIC BLOOD PRESSURE: 116 MMHG | RESPIRATION RATE: 15 BRPM | TEMPERATURE: 98.2 F | HEART RATE: 109 BPM

## 2024-01-11 PROCEDURE — 6370000000 HC RX 637 (ALT 250 FOR IP): Performed by: STUDENT IN AN ORGANIZED HEALTH CARE EDUCATION/TRAINING PROGRAM

## 2024-01-11 PROCEDURE — 6370000000 HC RX 637 (ALT 250 FOR IP)

## 2024-01-11 RX ORDER — OXYCODONE HYDROCHLORIDE 5 MG/1
5 TABLET ORAL EVERY 6 HOURS PRN
Qty: 10 TABLET | Refills: 0 | Status: SHIPPED | OUTPATIENT
Start: 2024-01-11 | End: 2024-01-14

## 2024-01-11 RX ORDER — IBUPROFEN 800 MG/1
800 TABLET ORAL EVERY 6 HOURS
Qty: 40 TABLET | Refills: 0 | Status: SHIPPED | OUTPATIENT
Start: 2024-01-11

## 2024-01-11 RX ORDER — POLYETHYLENE GLYCOL 3350 17 G/17G
17 POWDER, FOR SOLUTION ORAL DAILY
Qty: 85 G | Refills: 0 | Status: SHIPPED | OUTPATIENT
Start: 2024-01-11 | End: 2024-01-16

## 2024-01-11 RX ORDER — PSEUDOEPHEDRINE HCL 30 MG
100 TABLET ORAL 2 TIMES DAILY
Qty: 20 CAPSULE | Refills: 0 | Status: SHIPPED | OUTPATIENT
Start: 2024-01-11

## 2024-01-11 RX ADMIN — DOCUSATE SODIUM 100 MG: 100 CAPSULE, LIQUID FILLED ORAL at 13:47

## 2024-01-11 RX ADMIN — IBUPROFEN 800 MG: 800 TABLET, FILM COATED ORAL at 02:22

## 2024-01-11 RX ADMIN — IBUPROFEN 800 MG: 800 TABLET, FILM COATED ORAL at 13:47

## 2024-01-11 RX ADMIN — ACETAMINOPHEN 1000 MG: 500 TABLET ORAL at 13:46

## 2024-01-11 ASSESSMENT — PAIN DESCRIPTION - DESCRIPTORS: DESCRIPTORS: CRAMPING;SORE

## 2024-01-11 ASSESSMENT — PAIN DESCRIPTION - LOCATION: LOCATION: ABDOMEN

## 2024-01-11 ASSESSMENT — PAIN SCALES - GENERAL
PAINLEVEL_OUTOF10: 5
PAINLEVEL_OUTOF10: 0

## 2024-01-11 ASSESSMENT — PAIN DESCRIPTION - ORIENTATION: ORIENTATION: LOWER

## 2024-01-11 NOTE — DISCHARGE SUMMARY
[address] [place] [address]   Office [phone], Fax [phone]    Obstetrical Discharge Summary     Name: Yolanda Cheney MRN: [id number]  SSN: [id number]    YOB: 2000  Age: 23 y.o.  Sex: female      Admit Date: 2024    Discharge Date: 2024     Admitting Physician: Asiya Salgado DO     Attending Physician:  Asiya Salgado DO     Admission Diagnoses: Previous  delivery affecting pregnancy [O34.219]  39 weeks gestation of pregnancy [Z3A.39]    Discharge Diagnoses:   Information for the patient's :  Daniel Araujo, Nolberto Guerrero [[id number]]   @[phone]@      Additional Diagnoses:  No components found for: \"OBEXTABORH\", \"OBEXTABSCRN\", \"OBEXTRUBELLA\", \"OBEXTGRBS\"    Immunization(s): There is no immunization history for the selected administration types on file for this patient.     Hospital Course:   Patient is a 23 y.o.  s/p repeat LTCS at 39 weeks 1 days.  Presented for  repeat LTCS with .  Pregnancy notable for hx of C/S for NRFHT/FTP at 9cm, hx of clamydia (negative BILL).  Delivered TLFI by rLTCS without complications.  Normal hospital course following the delivery.  On day of discharge patient reported minimal lochia, well controlled pain, and no other complaints.  Discharged with pain regimen and bowel regimen.  Advised to continue prenatal vitamins.  Follow up with Dr. Rodriges in 2 weeks for incision check.     Depression Scale  Postpartum Depression: Low Risk  (1/10/2024)    [place]  Depression Scale     Last EPDS Total Score: 2     Last EPDS Self Harm Result: Never        Follow up test at discharge: none  Condition at Discharge:  stable  Disposition: Discharge to Home    Physical exam:  /68   Pulse (!) 109   Temp 98.2 °F (36.8 °C) (Oral)   Resp 15   LMP 04/10/2023   SpO2 100%   Breastfeeding Unknown     Exam:  Patient without distress.               CTAB, no w/r/r/c

## 2024-01-26 ENCOUNTER — OFFICE VISIT (OUTPATIENT)
Age: 24
End: 2024-01-26

## 2024-01-26 VITALS
OXYGEN SATURATION: 98 % | DIASTOLIC BLOOD PRESSURE: 81 MMHG | SYSTOLIC BLOOD PRESSURE: 125 MMHG | WEIGHT: 131 LBS | BODY MASS INDEX: 21.47 KG/M2 | TEMPERATURE: 97.8 F | HEART RATE: 98 BPM

## 2024-01-26 PROCEDURE — 0503F POSTPARTUM CARE VISIT: CPT | Performed by: STUDENT IN AN ORGANIZED HEALTH CARE EDUCATION/TRAINING PROGRAM

## 2024-01-26 NOTE — PROGRESS NOTES
Chief Complaint   Patient presents with    Wound Check     Vitals:    01/26/24 1058 01/26/24 1103   BP: 133/83 125/81   Pulse: 99 98   Temp: 97.8 °F (36.6 °C)    TempSrc: Oral    SpO2: 98%    Weight: 59.4 kg (131 lb)      1. Have you been to the ER, urgent care clinic since your last visit?  Hospitalized since your last visit?No    2. Have you seen or consulted any other health care providers outside of the [facility] since your last visit?  Include any pap smears or colon screening.  no

## 2024-01-26 NOTE — PROGRESS NOTES
[address] [place] [address]   Office [phone], Fax [phone]    Subjective:     History provided by patient       2 week  incision check and Post Partum Note    23 y.o. female , presenting for postpartum visit s/p repeat C/S delivery at 39w1d.  Patient doing well post-partum without significant complaint.      Lochia: normal  Pain: controlled  Baby: doing well, has seen PCP  Sexual activity: has not resumed  Plan for contraception: IUD - to be done at [facility]  Breast/bottle: breast + formula  Support from FOB/family:   Symptoms of depression: none  EDPS score: 2      Objective:     Vitals:    24 1103   BP: 125/81   Pulse: 98   Temp:    SpO2:            Exam:  Patient without distress.    Abdomen soft, fundus firm at level of umbilicus, nontender.    Skin: low transverse  incision is clean dry and intact.  No sign of infection.                Lower extremities:  No edema. No palpable cords or tenderness.      Assessment and orders:     Assessment and Plan:    Yolanda Cheney is a 23 y.o. female s/p rLTCS.  Patient having uncomplicated post-partum course.      Continue routine care  Call clinic or make appointment for symptoms of sadness  Pt to f/up [facility] for placement of IUD    Pt was discussed with Dr Melo (attending physician).    I have reviewed patient medical and social history and medications.  I have reviewed pertinent labs results and other data. I have discussed the diagnosis with the patient and the intended plan as seen in the above orders. The patient has received an after-visit summary and questions were answered concerning future plans. I have discussed medication side effects and warnings with the patient as well.

## 2024-02-23 ENCOUNTER — OFFICE VISIT (OUTPATIENT)
Age: 24
End: 2024-02-23

## 2024-02-23 VITALS
WEIGHT: 131 LBS | DIASTOLIC BLOOD PRESSURE: 75 MMHG | HEART RATE: 78 BPM | BODY MASS INDEX: 21.47 KG/M2 | RESPIRATION RATE: 16 BRPM | SYSTOLIC BLOOD PRESSURE: 121 MMHG

## 2024-02-23 DIAGNOSIS — N94.6 DYSMENORRHEA: ICD-10-CM

## 2024-02-23 DIAGNOSIS — R03.0 ELEVATED SYSTOLIC BLOOD PRESSURE READING WITHOUT DIAGNOSIS OF HYPERTENSION: ICD-10-CM

## 2024-02-23 DIAGNOSIS — Z98.891 HISTORY OF CESAREAN DELIVERY: ICD-10-CM

## 2024-02-23 ASSESSMENT — PATIENT HEALTH QUESTIONNAIRE - PHQ9
2. FEELING DOWN, DEPRESSED OR HOPELESS: 0
SUM OF ALL RESPONSES TO PHQ QUESTIONS 1-9: 0
SUM OF ALL RESPONSES TO PHQ9 QUESTIONS 1 & 2: 0
1. LITTLE INTEREST OR PLEASURE IN DOING THINGS: 0
SUM OF ALL RESPONSES TO PHQ QUESTIONS 1-9: 0

## 2024-02-23 NOTE — PROGRESS NOTES
[address] [place] [address]   Office [phone], Fax [phone]    Subjective:     Hx obtained with assistance of [employer] .  6 Week Post Partum Note:    24 y.o. female  presenting for 6 week postpartum visit s/p rLTCS at 39w1d.  Patient doing well post-partum without significant complaint.      - Pt seen on 24 for 2 week incision check.    Lochia: normal  Pain: controlled  Baby: doing well, has seen PCP    Plan for contraception: Desires IUD (originally wanted at [facility] - now desires to have done here).  Breast/bottle: breast + formula.    Support from FOB/family:     [place] Depression Scale Score: 2    ROS:  Per HPI.      Objective:     Vitals:    24 1634   BP: 121/75   Pulse:    Resp:            Exam:  Patient without distress.    Heart regular rate.    Breathing comfortably on room air.    Abdomen soft, fundus firm at level of umbilicus, nontender. Incision site C/D/I. No signs of infection.               Lower extremities:  No edema.      Pertinent Labs/Studies:       Assessment and orders:     Assessment and Plan:    Yolanda Cheney is a 24 y.o.  s/p  at 39 weeks 1 days.  Patient having uncomplicated post-partum course.      Continue routine care.  Call clinic or make appointment for symptoms of sadness. Reassured by EPDS.  Pt to f/up in 2-4 weeks for IUD placement (discussed IUD as contraceptive method).  Follow-up BP at time of IUD placement (BP borderline elevated, but better on re-check. Recommended checking BP daily at home and recording values in log. Recommended bringing log to IUD appointment).    Pt was discussed with Dr. Wright (attending physician).    The patient has received an after-visit summary and questions were answered concerning future plans.    Villa Child MD  Family Medicine Resident

## 2024-03-15 ENCOUNTER — TELEPHONE (OUTPATIENT)
Age: 24
End: 2024-03-15

## 2024-03-15 NOTE — TELEPHONE ENCOUNTER
Called patient with , unable to leave message. Received fax from specialty pharmacy that they have not been able to get in touch with patient for IUD. I tried calling patient multiple times to give her number to call to give permission to pharmacy to ship out IUD. If patient calls back, please inform her she needs to call [phone].